# Patient Record
Sex: FEMALE | Race: WHITE | NOT HISPANIC OR LATINO | URBAN - METROPOLITAN AREA
[De-identification: names, ages, dates, MRNs, and addresses within clinical notes are randomized per-mention and may not be internally consistent; named-entity substitution may affect disease eponyms.]

---

## 2023-02-22 NOTE — ASU PATIENT PROFILE, ADULT - FALL HARM RISK - UNIVERSAL INTERVENTIONS
Bed in lowest position, wheels locked, appropriate side rails in place/Call bell, personal items and telephone in reach/Instruct patient to call for assistance before getting out of bed or chair/Non-slip footwear when patient is out of bed/[place] to call system/Physically safe environment - no spills, clutter or unnecessary equipment/Purposeful Proactive Rounding/Room/bathroom lighting operational, light cord in reach

## 2023-02-22 NOTE — ASU PATIENT PROFILE, ADULT - NSICDXPASTSURGICALHX_GEN_ALL_CORE_FT
PAST SURGICAL HISTORY:  H/O total mastectomy     H/O: hysterectomy     History of breast implant     History of thyroid surgery     S/P colon resection

## 2023-02-23 ENCOUNTER — OUTPATIENT (OUTPATIENT)
Dept: OUTPATIENT SERVICES | Facility: HOSPITAL | Age: 68
LOS: 1 days | Discharge: HOME | End: 2023-02-23
Payer: MEDICARE

## 2023-02-23 VITALS
WEIGHT: 106.04 LBS | RESPIRATION RATE: 17 BRPM | HEIGHT: 58 IN | DIASTOLIC BLOOD PRESSURE: 61 MMHG | HEART RATE: 72 BPM | SYSTOLIC BLOOD PRESSURE: 108 MMHG | OXYGEN SATURATION: 98 % | TEMPERATURE: 98 F

## 2023-02-23 VITALS
HEART RATE: 83 BPM | SYSTOLIC BLOOD PRESSURE: 125 MMHG | OXYGEN SATURATION: 97 % | DIASTOLIC BLOOD PRESSURE: 71 MMHG | RESPIRATION RATE: 17 BRPM

## 2023-02-23 DIAGNOSIS — Z98.82 BREAST IMPLANT STATUS: Chronic | ICD-10-CM

## 2023-02-23 DIAGNOSIS — Z90.10 ACQUIRED ABSENCE OF UNSPECIFIED BREAST AND NIPPLE: Chronic | ICD-10-CM

## 2023-02-23 DIAGNOSIS — Z90.49 ACQUIRED ABSENCE OF OTHER SPECIFIED PARTS OF DIGESTIVE TRACT: Chronic | ICD-10-CM

## 2023-02-23 DIAGNOSIS — Z98.890 OTHER SPECIFIED POSTPROCEDURAL STATES: Chronic | ICD-10-CM

## 2023-02-23 DIAGNOSIS — Z90.710 ACQUIRED ABSENCE OF BOTH CERVIX AND UTERUS: Chronic | ICD-10-CM

## 2023-02-23 PROCEDURE — 88305 TISSUE EXAM BY PATHOLOGIST: CPT | Mod: 26

## 2023-02-23 DEVICE — KIT CRYOABLATION CLARIFIX: Type: IMPLANTABLE DEVICE | Site: BILATERAL | Status: FUNCTIONAL

## 2023-02-23 RX ORDER — ROSUVASTATIN CALCIUM 5 MG/1
1 TABLET ORAL
Qty: 0 | Refills: 0 | DISCHARGE

## 2023-02-23 RX ORDER — ALENDRONATE SODIUM 70 MG/1
1 TABLET ORAL
Qty: 0 | Refills: 0 | DISCHARGE

## 2023-02-23 RX ORDER — VALACYCLOVIR 500 MG/1
1 TABLET, FILM COATED ORAL
Qty: 0 | Refills: 0 | DISCHARGE

## 2023-02-23 RX ORDER — SODIUM CHLORIDE 9 MG/ML
1000 INJECTION, SOLUTION INTRAVENOUS
Refills: 0 | Status: DISCONTINUED | OUTPATIENT
Start: 2023-02-23 | End: 2023-02-23

## 2023-02-23 RX ORDER — ACETAMINOPHEN 500 MG
725 TABLET ORAL ONCE
Refills: 0 | Status: COMPLETED | OUTPATIENT
Start: 2023-02-23 | End: 2023-02-23

## 2023-02-23 RX ORDER — EZETIMIBE 10 MG/1
1 TABLET ORAL
Qty: 0 | Refills: 0 | DISCHARGE

## 2023-02-23 RX ADMIN — SODIUM CHLORIDE 100 MILLILITER(S): 9 INJECTION, SOLUTION INTRAVENOUS at 14:25

## 2023-02-23 RX ADMIN — Medication 725 MILLIGRAM(S): at 14:15

## 2023-02-23 RX ADMIN — Medication 290 MILLIGRAM(S): at 13:45

## 2023-02-23 NOTE — ASU DISCHARGE PLAN (ADULT/PEDIATRIC) - NS MD DC FALL RISK RISK
For information on Fall & Injury Prevention, visit: [email or web address] OR  [email or web address] OR  https://www.cdc.gov/steadi/patient.html

## 2023-02-23 NOTE — BRIEF OPERATIVE NOTE - NSICDXBRIEFPOSTOP_GEN_ALL_CORE_FT
POST-OP DIAGNOSIS:  Rhinitis 23-Feb-2023 12:51:28  Samia Dumont  Pansinusitis 23-Feb-2023 12:51:34  Samia Dumont  Deviated septum 23-Feb-2023 12:51:40  Samia Dumont  Nasal turbinate hypertrophy 23-Feb-2023 12:51:45  Samia Dumont

## 2023-02-23 NOTE — BRIEF OPERATIVE NOTE - NSICDXBRIEFPROCEDURE_GEN_ALL_CORE_FT
PROCEDURES:  Partial ethmoidectomy 23-Feb-2023 12:49:30  Samia Dumont  Endoscopic maxillary antrotomy 23-Feb-2023 12:49:38  Samia Dumont  FESS, with 3D imaging guidance 23-Feb-2023 12:49:44  Samia Dumont  SMR (submucous resection) 23-Feb-2023 12:49:49  Samia Dumont  Turbinate resection 23-Feb-2023 12:50:18  Samia Dumont  Radiofrequency ablation, lesion, nose 23-Feb-2023 12:50:43  Samia Dumont

## 2023-02-23 NOTE — BRIEF OPERATIVE NOTE - NSICDXBRIEFPREOP_GEN_ALL_CORE_FT
PRE-OP DIAGNOSIS:  Pansinusitis 23-Feb-2023 12:50:52  Samia Dumont  Deviated septum 23-Feb-2023 12:50:59  Samia Dumont  Nasal turbinate hypertrophy 23-Feb-2023 12:51:04  Samia Dumont  Chronic rhinitis 23-Feb-2023 12:51:17  Samia Dumont

## 2023-03-09 LAB — SURGICAL PATHOLOGY STUDY: SIGNIFICANT CHANGE UP

## 2025-02-03 NOTE — ASU PATIENT PROFILE, ADULT - HAVE YOU RECEIVED AT LEAST TWO PFIZER AND/OR MODERNA VACCINATIONS (IN ANY COMBINATION) AND/OR ONE JOHNSON & JOHNSON VACCINATION?
This is the patient's 5th no show and has 1 same day cancellation. Should the patient be sent a warning letter or a term letter.     Yes

## (undated) DEVICE — SUT CHROMIC 4-0 18" G-3

## (undated) DEVICE — ELCTR BIPOLAR CELON PROBREATH

## (undated) DEVICE — DRAPE MAYO STAND 23"

## (undated) DEVICE — DRSG SPLINT NASAL CHITOSAN DISSOLVABLE

## (undated) DEVICE — SHAVER BLADE OLYMPUS DIEGO ELITE 4MM

## (undated) DEVICE — TUBING DIEGO DECLOG

## (undated) DEVICE — ELCTR BOVIE SUCTION 8FR 6"

## (undated) DEVICE — SPHERE MARKER FOR BRAIN LAB IMAGE (3 SPHERES)

## (undated) DEVICE — SLV COMPRESSION KNEE MED

## (undated) DEVICE — BRAINLAB HEADBAND DISP

## (undated) DEVICE — SOL ANTI FOG

## (undated) DEVICE — PACK RHINOPLASTY

## (undated) DEVICE — PETRI DISH MED 3.5"

## (undated) DEVICE — DRSG TELFA 3 X 8

## (undated) DEVICE — GLV 6.5 PROTEXIS (WHITE)

## (undated) DEVICE — WARMING BLANKET LOWER ADULT

## (undated) DEVICE — SUT PLAIN GUT 4-0 18" SC-1

## (undated) DEVICE — POSITIONER FOAM EGG CRATE ULNAR 2PCS (PINK)